# Patient Record
Sex: FEMALE | Race: ASIAN | NOT HISPANIC OR LATINO | Employment: FULL TIME | URBAN - METROPOLITAN AREA
[De-identification: names, ages, dates, MRNs, and addresses within clinical notes are randomized per-mention and may not be internally consistent; named-entity substitution may affect disease eponyms.]

---

## 2022-10-10 ENCOUNTER — HOSPITAL ENCOUNTER (OUTPATIENT)
Dept: GENERAL RADIOLOGY | Facility: HOSPITAL | Age: 42
Discharge: HOME | End: 2022-10-10
Admitting: FAMILY MEDICINE

## 2022-10-10 ENCOUNTER — OFFICE VISIT (OUTPATIENT)
Dept: FAMILY MEDICINE CLINIC | Facility: CLINIC | Age: 42
End: 2022-10-10

## 2022-10-10 VITALS
SYSTOLIC BLOOD PRESSURE: 143 MMHG | HEIGHT: 65 IN | OXYGEN SATURATION: 99 % | DIASTOLIC BLOOD PRESSURE: 97 MMHG | WEIGHT: 158.6 LBS | BODY MASS INDEX: 26.42 KG/M2 | HEART RATE: 75 BPM | TEMPERATURE: 96.6 F

## 2022-10-10 DIAGNOSIS — I10 ESSENTIAL HYPERTENSION: ICD-10-CM

## 2022-10-10 DIAGNOSIS — M25.522 LEFT ELBOW PAIN: Primary | ICD-10-CM

## 2022-10-10 DIAGNOSIS — M75.22 BICEPS TENDINITIS ON LEFT: ICD-10-CM

## 2022-10-10 PROCEDURE — 99203 OFFICE O/P NEW LOW 30 MIN: CPT | Performed by: FAMILY MEDICINE

## 2022-10-10 PROCEDURE — 73070 X-RAY EXAM OF ELBOW: CPT

## 2022-10-10 RX ORDER — AMLODIPINE BESYLATE 10 MG/1
1 TABLET ORAL DAILY
COMMUNITY
Start: 2022-04-29 | End: 2022-12-08 | Stop reason: SDUPTHER

## 2022-10-10 RX ORDER — FERROUS SULFATE TAB EC 324 MG (65 MG FE EQUIVALENT) 324 (65 FE) MG
324 TABLET DELAYED RESPONSE ORAL
COMMUNITY

## 2022-10-10 RX ORDER — ERGOCALCIFEROL 1.25 MG/1
50000 CAPSULE ORAL
COMMUNITY

## 2022-10-10 NOTE — PROGRESS NOTES
"Chief Complaint  Elbow Injury (Elbow pain/ hurts to extend left arm x 2 months )    Kaleigh Purvis presents to [facility] PRIMARY [facility]ARE  History of Present Illness     New patient.  Here to establish.  Left elbow pain.  2 months.  Hurts especially when she extends the elbow.  Also hurts to lift things and she feels little weak.  However no shooting pain or numbness or tingling into the arm of the wrist.  Normal to hand.  No known injury.  No known repetitive motion.  Worse with certain activities.  No swelling or pain of the wrist or knuckles.  She otherwise feels well.    Hypertension.  Longstanding.  She continues amlodipine 10 mg a day.  Her blood pressure is elevated today.  She has not checked it at home in some time.  I reviewed lab work from April of this year at [facility].  Normal creatinine sodium and potassium.    Objective   Vital Signs:  /97   Pulse 75   Temp 96.6 °F (35.9 °C) (Temporal)   Ht 165.1 cm (65\")   Wt 71.9 kg (158 lb 9.6 oz)   SpO2 99%   BMI 26.39 kg/m²   Estimated body mass index is 26.39 kg/m² as calculated from the following:    Height as of this encounter: 165.1 cm (65\").    Weight as of this encounter: 71.9 kg (158 lb 9.6 oz).          Physical Exam  Constitutional:       General: She is not in acute distress.     Appearance: She is not ill-appearing.   Cardiovascular:      Rate and Rhythm: Normal rate and regular rhythm.   Musculoskeletal:      Comments: Full range of motion of the shoulders and elbows.  She has pain to palpation at the insertion of the left biceps.  Worse with resisted pronation of the forearm.  No pain along the medial or lateral epicondyles.  The wrist and hand joints appear unremarkable.  Good pulses.        Result Review :                Assessment and Plan   Diagnoses and all orders for this visit:    1. Left elbow pain (Primary)  -     XR Elbow 2 View Left (In Office)  -     Ambulatory Referral to Physical Therapy " Evaluate and treat    2. Biceps tendinitis on left  -     Ambulatory Referral to Physical Therapy Evaluate and treat    3. Essential hypertension      Left elbow pain, 2 months probable biceps tendinopathy.  Worse with resisted pronation.  Referral to physical therapy.  Checking x-ray to rule out bone spur or other abnormality.    Hypertension.  Not controlled at this time.  She is going to be checking her blood pressure on a regular basis and see me back in 6 weeks.  The patient discharge information.         Follow Up   No follow-ups on file.  Patient was given instructions and counseling regarding her condition or for health maintenance advice. Please see specific information pulled into the AVS if appropriate.

## 2022-10-10 NOTE — PATIENT INSTRUCTIONS
The left elbow is likely caused by a tendinopathy or tendinitis of the biceps muscle, that involved and pronating the forearm.  It should get better on its own but I want to get an x-ray to rule out a bone spur or other problem, and also recommending a physical therapist with [facility].  I placed a referral and somebody should contact you    With regards to your blood pressure and concern that is high today.  I would like you to check your blood pressure on a regular basis over the next 2 or 3 weeks, sit 5 minutes first.  Make sure your blood pressure machine is not too old.  And then send me blood pressure readings in about 3 or 4 weeks and I want to see you in 6 weeks for a blood pressure recheck and general recheck.

## 2022-10-13 ENCOUNTER — PATIENT ROUNDING (BHMG ONLY) (OUTPATIENT)
Dept: FAMILY MEDICINE CLINIC | Facility: CLINIC | Age: 42
End: 2022-10-13

## 2022-10-13 NOTE — PROGRESS NOTES
A My-Chart message has been sent to the patient for PATIENT ROUNDING with [facility]

## 2022-11-29 ENCOUNTER — TELEMEDICINE (OUTPATIENT)
Dept: FAMILY MEDICINE CLINIC | Facility: CLINIC | Age: 42
End: 2022-11-29

## 2022-11-29 ENCOUNTER — TREATMENT (OUTPATIENT)
Dept: PHYSICAL THERAPY | Facility: CLINIC | Age: 42
End: 2022-11-29

## 2022-11-29 ENCOUNTER — TELEPHONE (OUTPATIENT)
Dept: FAMILY MEDICINE CLINIC | Facility: CLINIC | Age: 42
End: 2022-11-29

## 2022-11-29 DIAGNOSIS — R20.0 NUMBNESS AND TINGLING OF LEFT UPPER EXTREMITY: ICD-10-CM

## 2022-11-29 DIAGNOSIS — I10 ESSENTIAL HYPERTENSION: Primary | ICD-10-CM

## 2022-11-29 DIAGNOSIS — M25.522 LEFT ELBOW PAIN: Primary | ICD-10-CM

## 2022-11-29 DIAGNOSIS — R20.2 NUMBNESS AND TINGLING OF LEFT UPPER EXTREMITY: ICD-10-CM

## 2022-11-29 DIAGNOSIS — S46.212D BICEPS STRAIN, LEFT, SUBSEQUENT ENCOUNTER: ICD-10-CM

## 2022-11-29 PROCEDURE — 97162 PT EVAL MOD COMPLEX 30 MIN: CPT | Performed by: PHYSICAL THERAPIST

## 2022-11-29 PROCEDURE — 99213 OFFICE O/P EST LOW 20 MIN: CPT | Performed by: FAMILY MEDICINE

## 2022-11-29 PROCEDURE — 97110 THERAPEUTIC EXERCISES: CPT | Performed by: PHYSICAL THERAPIST

## 2022-11-29 PROCEDURE — 97140 MANUAL THERAPY 1/> REGIONS: CPT | Performed by: PHYSICAL THERAPIST

## 2022-11-29 RX ORDER — SPIRONOLACTONE 25 MG/1
25 TABLET ORAL DAILY
Qty: 90 TABLET | Refills: 1 | Status: SHIPPED | OUTPATIENT
Start: 2022-11-29

## 2022-11-29 NOTE — PROGRESS NOTES
Physical Therapy Initial Evaluation and Plan of Care      Patient: Mayte Purvis   : 1980  Diagnosis/ICD-10 Code:  Left elbow pain [M25.522]  Referring practitioner: Los Askew MD    Subjective Evaluation    History of Present Illness  Mechanism of injury: Insidious onset September. Initially had pain even  lifting cell phone. Better now but cannot do quick extensions.  Lifting with both arms Ok  Heavy grocery bag or heavy pot increases pain at elbow  Intermittent  N/T in LUE but has improved  Vertigo after Covid   Does not exercise, no recreational activities reported  ALso notes that L knee sometimes kemal when I pivot. I fell in house months ago and knee has bothered me since  ALso have some vertigo symptoms when rising from laying down.          Patient Occupation:  case managment full time Quality of life: good    Pain  Current pain ratin  Location: cubital fossa  Quality: discomfort, pulling and radiating  Aggravating factors: lifting and movement  Progression: improved    Social Support  Lives in: multiple-level home  Lives with: spouse and young children    Hand dominance: right    Diagnostic Tests  X-ray: normal    Treatments  No previous or current treatments  Patient Goals  Patient goals for therapy: increased strength and independence with ADLs/IADLs             Objective          Postural Observations  Seated posture: fair        Palpation   Left   No palpable tenderness to the biceps.   Tenderness of the biceps, scalenes and upper trapezius.     Additional Palpation Details  Elevated left first rib    Passive Range of Motion   Left Knee   Normal passive range of motion  Extension: Left knee passive extension: hyperextends.     Right Knee   Normal passive range of motion  Extension: Right knee passive extension: hyperextends.     Joint Play     Left Elbow   Hypermobile in the humeroulnar joint.     Right Elbow   Hypermobile in the humeroulnar joint.     Strength/Myotome  Testing   Cervical Spine     Right   Normal strength    Left Shoulder     Planes of Motion   Flexion: 4+   Extension: 5   Abduction: 4   External rotation at 0°: 4+   Internal rotation at 0°: 4+     Left Elbow   Flexion: 4 (sore)  Extension: 4+    Left Wrist/Hand   Wrist extension: 5 (sore)  Wrist flexion: 5    Tests   Cervical     Left   Positive brachial plexus traction.     Left Knee   Negative lateral Lm, medial Lm, valgus stress test at 0 degrees, valgus stress test at 30 degrees, varus stress test at 0 degrees and varus stress test at 30 degrees.     Left Knee Flexibility Comments:   Hyperextends > right knee    Functional Assessment     Comments  Quick DASH 25%          Assessment & Plan     Assessment  Impairments: abnormal muscle tone, activity intolerance, impaired physical strength, lacks appropriate home exercise program and pain with function  Functional Limitations: carrying objects, lifting and unable to perform repetitive tasks  Assessment details: Pt is a good candidate for skilled PT intervention to restore functional AROM and strength to return to previous level of ADL's.  Pt had reproduction of LUE symptoms with scalene/trap stretch with RSB Cspine. SHe carries her purse on left shoulder  Advised pt she can schedule with Nataly Ding if she wants eval for BPPV. I also instructed her in quad /hamstring strengthening for L knee.  Prognosis: good    Goals  Plan Goals: Short Term Goals ( 2 weeks)  1. Pt to perform quick extension movements LUE with minimal pain  2. Pt to exhibit 4+/5 bicep strength with less pain to allow for lifting with less pain  3. Pt to be (I) with HEP     Long Term Goals ( 6 weeks)  1. Pt to report no pain with lifting with ADL's  2. Pt to demonstrate 5/5 strength in biceps and shoulder  3. Pt able to lift and carry groceries and pots with no pain  4. Pt to score < 10% on DASH    Plan  Therapy options: will be seen for skilled therapy services  Planned therapy  interventions: body mechanics training, flexibility, functional ROM exercises, home exercise program, joint mobilization, manual therapy, postural training, soft tissue mobilization, spinal/joint mobilization, strengthening, stretching and therapeutic activities  Frequency: 1x week  Duration in weeks: 6  Treatment plan discussed with: patient        Manual Therapy:    10     mins  01176;  Therapeutic Exercise:    15     mins  80348;     Neuromuscular Chepe:        mins  73997;    Therapeutic Activity:          mins  22870;     Gait Training:           mins  76122;     Ultrasound:          mins  60498;    Electrical Stimulation:         mins  85950 ( );  Dry Needling          mins self-pay    Timed Treatment:   25   mins   Total Treatment:     60   mins    PT SIGNATURE: Celia Arredondo PT   [place] License # [id number]  DATE TREATMENT INITIATED: 11/29/2022    Initial Certification  Certification Period: 2/27/2023  I certify that the therapy services are furnished while this patient is under my care.  The services outlined above are required by this patient, and will be reviewed every 90 days.     PHYSICIAN: Los Askew MD      DATE:     Please sign and return via fax to [phone].. Thank you, [facility] Physical Therapy.

## 2022-11-29 NOTE — TELEPHONE ENCOUNTER
Called pt no answer lvm to sched a 2 week lab appt and a 3 month follow up on htn. Labs are ordered ok for hub to read and sched

## 2022-11-29 NOTE — PROGRESS NOTES
"Chief Complaint  F/U elevated blood pressure.     Subjective        Mayte Purvis presents to [facility]  History of Present Illness     Coronavirus pandemic telehealth visit.  Good audio and video connection.  Patient gave informed consent through the Rixty check in process.    Follow-up hypertension.  She was diagnosed with essential hypertension about 5 years ago.  Initially she was on lisinopril but she states that she did not respond to it.  No adverse effects described.  She was then placed on amlodipine and for significant period of time she states her blood pressure was running normal.  However she had COVID in recent months that she believes since then her blood pressures been higher.  She has a history of hypertension, with her parents with high blood pressure in their 50s.  She otherwise feels well.  She states occasionally if she feels for a brief period of time the little bit dizzy.  Otherwise no no symptoms.  I reviewed her blood pressure readings from home.  Her diastolic is in the 90s consistently and the systolic is consistently in the 130s and 140s..  Her creatinine in April was normal.  Her potassium was on the low side at 3.7.    Objective   Vital Signs:  There were no vitals taken for this visit.  Estimated body mass index is 26.39 kg/m² as calculated from the following:    Height as of 10/10/22: 165.1 cm (65\").    Weight as of 10/10/22: 71.9 kg (158 lb 9.6 oz).      1    Physical Exam  Constitutional:       Comments: The patient appears in no acute distress.  No respiratory distress.  Mood normal.        Result Review :  The following data was reviewed by: Los Askew MD on 11/29/2022:  Common labs    Common Labs 4/8/22 4/8/22    1508 1508   Glucose 102 (A)    BUN 8    Creatinine 0.56    Sodium 139    Potassium 3.7    Chloride 100    Calcium 9.3    Albumin 4.2    Total Bilirubin <0.2 (A)    Alkaline Phosphatase 78    AST (SGOT) 16    ALT (SGPT) 12    WBC  " 8.65   Hemoglobin  13.3   Hematocrit  40.0   Platelets  424   (A) Abnormal value                      Assessment and Plan   Diagnoses and all orders for this visit:    1. Essential hypertension (Primary)  -     Comprehensive Metabolic Panel; Future  -     Aldosterone / Renin Ratio; Future    Other orders  -     spironolactone (Aldactone) 25 MG tablet; Take 1 tablet by mouth Daily.  Dispense: 90 tablet; Refill: 1      Probable essential hypertension.  Not controlled.  Continue amp zotepine 10 mg a day.  I am checking an aldosterone renin ratio and a CMP.  She did not respond reportedly to an ACE inhibitor number of years ago.  Her potassium was on the low side.  I am avoiding an ARB/diuretic combo at this time.  However may need to prescribe in the future.  For the time being I am prescribing spironolactone 25 mg daily.  She is going to get electrolytes and blood work as above in about 2 weeks.  I will see her back in 3 months, but she will be sending the blood pressure readings within 1 month.  She will contact us with questions.       I spent 18 minutes caring for Mayte on this date of service. This time includes time spent by me in the following activities:preparing for the visit, reviewing tests, performing a medically appropriate examination and/or evaluation , counseling and educating the patient/family/caregiver, ordering medications, tests, or procedures and documenting information in the medical record  Follow Up   No follow-ups on file.  Patient was given instructions and counseling regarding her condition or for health maintenance advice. Please see specific information pulled into the AVS if appropriate.

## 2022-11-29 NOTE — TELEPHONE ENCOUNTER
Hub staff attempted to follow warm transfer process and was unsuccessful     Caller: Mayte Purvis    Relationship to patient: Self    Best call back number:[phone]    Patient is needing: PATIENT IS RETURNING NATHEN'S CALL. PATIENT IS NEEDING TO SCHEDULE LABS     HUB CANNOT SCHEDULE LAB APPOINTMENTS

## 2022-12-08 NOTE — TELEPHONE ENCOUNTER
Rx Refill Note  Requested Prescriptions     Pending Prescriptions Disp Refills   • amLODIPine (NORVASC) 10 MG tablet 90 tablet 1     Sig: Take 1 tablet by mouth Daily.      Last office visit with prescribing clinician: 10/10/2022   Last telemedicine visit with prescribing clinician: 2/27/2023   Next office visit with prescribing clinician: 2/27/2023                         Would you like a call back once the refill request has been completed: [] Yes [] No    If the office needs to give you a call back, can they leave a voicemail: [] Yes [] No    Luisa Hays  12/08/22, 13:41 EST

## 2022-12-09 RX ORDER — AMLODIPINE BESYLATE 10 MG/1
10 TABLET ORAL DAILY
Qty: 90 TABLET | Refills: 1 | Status: SHIPPED | OUTPATIENT
Start: 2022-12-09

## 2023-02-17 DIAGNOSIS — I10 ESSENTIAL HYPERTENSION: ICD-10-CM

## 2023-02-26 LAB
ALBUMIN SERPL-MCNC: 4.3 G/DL (ref 3.5–5.2)
ALBUMIN/GLOB SERPL: 1.8 G/DL
ALDOST SERPL-MCNC: 36.1 NG/DL (ref 0–30)
ALDOST/RENIN PLAS-RTO: 139.4 {RATIO} (ref 0–30)
ALP SERPL-CCNC: 72 U/L (ref 39–117)
ALT SERPL-CCNC: 9 U/L (ref 1–33)
AST SERPL-CCNC: 13 U/L (ref 1–32)
BILIRUB SERPL-MCNC: 0.4 MG/DL (ref 0–1.2)
BUN SERPL-MCNC: 10 MG/DL (ref 6–20)
BUN/CREAT SERPL: 12.5 (ref 7–25)
CALCIUM SERPL-MCNC: 9.3 MG/DL (ref 8.6–10.5)
CHLORIDE SERPL-SCNC: 102 MMOL/L (ref 98–107)
CO2 SERPL-SCNC: 26.6 MMOL/L (ref 22–29)
CREAT SERPL-MCNC: 0.8 MG/DL (ref 0.57–1)
EGFRCR SERPLBLD CKD-EPI 2021: 92.7 ML/MIN/1.73
GLOBULIN SER CALC-MCNC: 2.4 GM/DL
GLUCOSE SERPL-MCNC: 85 MG/DL (ref 65–99)
POTASSIUM SERPL-SCNC: 4.4 MMOL/L (ref 3.5–5.2)
PROT SERPL-MCNC: 6.7 G/DL (ref 6–8.5)
RENIN PLAS-CCNC: 0.26 NG/ML/HR (ref 0.17–5.38)
SODIUM SERPL-SCNC: 140 MMOL/L (ref 136–145)

## 2023-03-02 ENCOUNTER — OFFICE VISIT (OUTPATIENT)
Dept: FAMILY MEDICINE CLINIC | Facility: CLINIC | Age: 43
End: 2023-03-02
Payer: COMMERCIAL

## 2023-03-02 VITALS
TEMPERATURE: 97.1 F | OXYGEN SATURATION: 99 % | HEART RATE: 76 BPM | WEIGHT: 162.7 LBS | BODY MASS INDEX: 27.11 KG/M2 | DIASTOLIC BLOOD PRESSURE: 76 MMHG | HEIGHT: 65 IN | SYSTOLIC BLOOD PRESSURE: 126 MMHG

## 2023-03-02 DIAGNOSIS — I10 ESSENTIAL HYPERTENSION: ICD-10-CM

## 2023-03-02 DIAGNOSIS — I10 ESSENTIAL HYPERTENSION: Primary | ICD-10-CM

## 2023-03-02 PROCEDURE — 99214 OFFICE O/P EST MOD 30 MIN: CPT | Performed by: FAMILY MEDICINE

## 2023-03-02 NOTE — PATIENT INSTRUCTIONS
With regards to your high blood pressure, I am concerned about possible hyperaldosteronism.  There are some clues including the low potassium and most certainly the elevated aldosterone level recently.  I am going to repeat it.  He sometimes will be transiently high for no good reason.  Holding off any type of adrenal imaging at this time.  I would like you to see your endocrinologist for follow-up.  Certainly continue the spironolactone and the amlodipine.    In the meantime I want you to check your blood pressure daily for the next 3 or 4 weeks.  I would like you to sit for about 5 minutes first both feet on the ground and then check it with the arm monitor.  Write the numbers down.  Then in about 2 or 3 weeks send me numbers via the [employer] portal as a attachment such as a PDF.  Note I will be out of town the week of March 20.    At this time no alarm.  But I would do want a better idea on the diagnosis.  I think the endocrinologist will be a great choice.  Otherwise see me back in about 6 months.

## 2023-03-02 NOTE — PROGRESS NOTES
"Chief Complaint  HTN f/u     Subjective        Mayte Purvis presents to [facility]  History of Present Illness     Follow-up hypertension.  She had some borderline low potassium readings.  She was having inadequate control of her blood pressure on amlodipine 10 mg daily.  I added spironolactone 25 mg daily.  Ordered a aldosterone renin ratio but it was not done until last week.  She has not been on the spironolactone for 3 months.  She feels no different other than increased urination.  No cardiovascular symptoms.  No neurovascular symptoms.  She has not been checking her blood pressure up until yesterday when it was high which she states she was stressed.  Her blood pressure is much better today.  Her creatinine is normal.  Potassium normal.  Aldosterone level 36.  Aldosterone renin ratio very high.  She has CT scan done of the abdomen in 2017 which revealed normal adrenal glands.  She has no other concerns about her health other than some ongoing fatigue.  Some previous elbow pain is resolved.  She is followed by endocrinologist also.    Objective   Vital Signs:  /76   Pulse 76   Temp 97.1 °F (36.2 °C) (Temporal)   Ht 165.1 cm (65\")   Wt 73.8 kg (162 lb 11.2 oz)   SpO2 99%   BMI 27.07 kg/m²   Estimated body mass index is 27.07 kg/m² as calculated from the following:    Height as of this encounter: 165.1 cm (65\").    Weight as of this encounter: 73.8 kg (162 lb 11.2 oz).             Physical Exam  Vitals and nursing note reviewed.   Constitutional:       General: She is not in acute distress.     Appearance: She is well-developed.   Cardiovascular:      Rate and Rhythm: Normal rate and regular rhythm.      Heart sounds: Normal heart sounds.   Pulmonary:      Effort: Pulmonary effort is normal.      Breath sounds: Normal breath sounds.   Musculoskeletal:      Cervical back: Normal range of motion.   Skin:     General: Skin is warm and dry.   Psychiatric:         Mood and " Affect: Mood normal.        Result Review :                   Assessment and Plan   Diagnoses and all orders for this visit:    1. Essential hypertension (Primary)  -     Aldosterone / Renin Ratio; Future      Hypertension.  Essential hypertension versus hyperaldosteronism.  Rechecking aldosterone level today.  I want her to start checking her blood pressure on a regular basis.  I would refer her back to her endocrinologist, she is going to call for an appointment.  I will see her in 6 months otherwise for complete physical examination.  In the meantime continue amlodipine, continue spironolactone, and check blood pressure readings at home.  See patient discharge instructions.       Follow Up   No follow-ups on file.  Patient was given instructions and counseling regarding her condition or for health maintenance advice. Please see specific information pulled into the AVS if appropriate.

## 2023-03-07 RX ORDER — ERGOCALCIFEROL 1.25 MG/1
50000 CAPSULE ORAL WEEKLY
Status: CANCELLED | OUTPATIENT
Start: 2023-03-07

## 2023-03-07 NOTE — TELEPHONE ENCOUNTER
Rx Refill Note  Requested Prescriptions     Pending Prescriptions Disp Refills   • ergocalciferol (ERGOCALCIFEROL) 1.25 MG (80674 UT) capsule       Sig: Take 1 capsule by mouth 1 (One) Time Per Week.      Last office visit with prescribing clinician: 3/2/2023   Last telemedicine visit with prescribing clinician: Visit date not found   Next office visit with prescribing clinician: 9/7/2023                         Would you like a call back once the refill request has been completed: [] Yes [] No    If the office needs to give you a call back, can they leave a voicemail: [] Yes [] No    Luisa Hays  03/07/23, 15:04 EST

## 2023-03-08 NOTE — TELEPHONE ENCOUNTER
disregard med request per pt mychart message   This encounter was created in error - please disregard.

## 2023-03-16 LAB
ALDOST SERPL-MCNC: 15.4 NG/DL (ref 0–30)
ALDOST/RENIN PLAS-RTO: 64.7 {RATIO} (ref 0–30)
RENIN PLAS-CCNC: 0.24 NG/ML/HR (ref 0.17–5.38)

## 2023-04-18 ENCOUNTER — TRANSCRIBE ORDERS (OUTPATIENT)
Dept: ADMINISTRATIVE | Facility: HOSPITAL | Age: 43
End: 2023-04-18
Payer: COMMERCIAL

## 2023-04-18 DIAGNOSIS — I10 ESSENTIAL HYPERTENSION, MALIGNANT: ICD-10-CM

## 2023-04-18 DIAGNOSIS — R79.89 HYPOURICEMIA: Primary | ICD-10-CM

## 2023-04-19 PROBLEM — R79.89 HIGH ALDOSTERONE LEVEL: Status: ACTIVE | Noted: 2023-04-19

## 2023-04-19 RX ORDER — SODIUM CHLORIDE 9 MG/ML
2000 INJECTION, SOLUTION INTRAVENOUS ONCE
Start: 2023-04-21 | End: 2023-04-21

## 2023-04-21 ENCOUNTER — HOSPITAL ENCOUNTER (OUTPATIENT)
Dept: INFUSION THERAPY | Facility: HOSPITAL | Age: 43
Discharge: HOME | End: 2023-04-21
Payer: COMMERCIAL

## 2023-04-21 VITALS
DIASTOLIC BLOOD PRESSURE: 90 MMHG | TEMPERATURE: 97.3 F | SYSTOLIC BLOOD PRESSURE: 130 MMHG | BODY MASS INDEX: 27.16 KG/M2 | WEIGHT: 163.2 LBS | RESPIRATION RATE: 20 BRPM | HEART RATE: 70 BPM | OXYGEN SATURATION: 100 %

## 2023-04-21 DIAGNOSIS — R79.89 HYPOURICEMIA: ICD-10-CM

## 2023-04-21 DIAGNOSIS — I10 ESSENTIAL HYPERTENSION, MALIGNANT: ICD-10-CM

## 2023-04-21 LAB
ANION GAP SERPL CALCULATED.3IONS-SCNC: 6.3 MMOL/L (ref 5–15)
ANION GAP SERPL CALCULATED.3IONS-SCNC: 7.2 MMOL/L (ref 5–15)
BUN SERPL-MCNC: 7 MG/DL (ref 6–20)
BUN SERPL-MCNC: 9 MG/DL (ref 6–20)
BUN/CREAT SERPL: 12.9 (ref 7–25)
BUN/CREAT SERPL: 13.5 (ref 7–25)
CALCIUM SPEC-SCNC: 8 MG/DL (ref 8.6–10.5)
CALCIUM SPEC-SCNC: 8.7 MG/DL (ref 8.6–10.5)
CHLORIDE SERPL-SCNC: 106 MMOL/L (ref 98–107)
CHLORIDE SERPL-SCNC: 111 MMOL/L (ref 98–107)
CO2 SERPL-SCNC: 22.8 MMOL/L (ref 22–29)
CO2 SERPL-SCNC: 26.7 MMOL/L (ref 22–29)
CREAT SERPL-MCNC: 0.52 MG/DL (ref 0.57–1)
CREAT SERPL-MCNC: 0.7 MG/DL (ref 0.57–1)
EGFRCR SERPLBLD CKD-EPI 2021: 108.8 ML/MIN/1.73
EGFRCR SERPLBLD CKD-EPI 2021: 116.9 ML/MIN/1.73
GLUCOSE SERPL-MCNC: 86 MG/DL (ref 65–99)
GLUCOSE SERPL-MCNC: 97 MG/DL (ref 65–99)
POTASSIUM SERPL-SCNC: 3.7 MMOL/L (ref 3.5–5.2)
POTASSIUM SERPL-SCNC: 3.9 MMOL/L (ref 3.5–5.2)
POTASSIUM SERPL-SCNC: 3.9 MMOL/L (ref 3.5–5.2)
SODIUM SERPL-SCNC: 139 MMOL/L (ref 136–145)
SODIUM SERPL-SCNC: 141 MMOL/L (ref 136–145)

## 2023-04-21 PROCEDURE — 96360 HYDRATION IV INFUSION INIT: CPT

## 2023-04-21 PROCEDURE — 96361 HYDRATE IV INFUSION ADD-ON: CPT

## 2023-04-21 PROCEDURE — 84244 ASSAY OF RENIN: CPT | Performed by: INTERNAL MEDICINE

## 2023-04-21 PROCEDURE — 36415 COLL VENOUS BLD VENIPUNCTURE: CPT

## 2023-04-21 PROCEDURE — 82088 ASSAY OF ALDOSTERONE: CPT | Performed by: INTERNAL MEDICINE

## 2023-04-21 PROCEDURE — 96365 THER/PROPH/DIAG IV INF INIT: CPT

## 2023-04-21 PROCEDURE — 96366 THER/PROPH/DIAG IV INF ADDON: CPT

## 2023-04-21 PROCEDURE — 80048 BASIC METABOLIC PNL TOTAL CA: CPT | Performed by: INTERNAL MEDICINE

## 2023-04-21 PROCEDURE — 84132 ASSAY OF SERUM POTASSIUM: CPT | Performed by: INTERNAL MEDICINE

## 2023-04-21 RX ORDER — POTASSIUM CHLORIDE 750 MG/1
40 TABLET, FILM COATED, EXTENDED RELEASE ORAL ONCE
Status: COMPLETED | OUTPATIENT
Start: 2023-04-21 | End: 2023-04-21

## 2023-04-21 RX ORDER — AMILORIDE HYDROCHLORIDE 5 MG/1
5 TABLET ORAL DAILY
COMMUNITY

## 2023-04-21 RX ORDER — SODIUM CHLORIDE 9 MG/ML
500 INJECTION, SOLUTION INTRAVENOUS CONTINUOUS
Status: DISCONTINUED | OUTPATIENT
Start: 2023-04-21 | End: 2023-04-23 | Stop reason: HOSPADM

## 2023-04-21 RX ORDER — POTASSIUM CHLORIDE 750 MG/1
40 CAPSULE, EXTENDED RELEASE ORAL ONCE
Status: DISCONTINUED | OUTPATIENT
Start: 2023-04-21 | End: 2023-04-21

## 2023-04-21 RX ADMIN — SODIUM CHLORIDE 500 ML/HR: 9 INJECTION, SOLUTION INTRAVENOUS at 11:38

## 2023-04-21 RX ADMIN — POTASSIUM CHLORIDE 40 MEQ: 750 TABLET, EXTENDED RELEASE ORAL at 10:54

## 2023-04-25 LAB — ALDOST SERPL-MCNC: 12 NG/DL (ref 0–30)

## 2023-04-28 LAB
ALDOST SERPL-MCNC: 21.6 NG/DL (ref 0–30)
RENIN PLAS-CCNC: 0.18 NG/ML/HR (ref 0.17–5.38)
RENIN PLAS-CCNC: <0.167 NG/ML/HR (ref 0.17–5.38)

## 2023-05-02 ENCOUNTER — TRANSCRIBE ORDERS (OUTPATIENT)
Dept: ADMINISTRATIVE | Facility: HOSPITAL | Age: 43
End: 2023-05-02
Payer: COMMERCIAL

## 2023-05-02 DIAGNOSIS — I10 ESSENTIAL HYPERTENSION, BENIGN: ICD-10-CM

## 2023-05-02 DIAGNOSIS — E26.09 PRIMARY HYPERALDOSTERONISM: Primary | ICD-10-CM

## 2023-05-17 ENCOUNTER — HOSPITAL ENCOUNTER (OUTPATIENT)
Dept: CT IMAGING | Facility: HOSPITAL | Age: 43
Discharge: HOME | End: 2023-05-17
Admitting: INTERNAL MEDICINE
Payer: COMMERCIAL

## 2023-05-17 DIAGNOSIS — I10 ESSENTIAL HYPERTENSION, BENIGN: ICD-10-CM

## 2023-05-17 DIAGNOSIS — E26.09 PRIMARY HYPERALDOSTERONISM: ICD-10-CM

## 2023-05-17 PROCEDURE — 25510000001 IOPAMIDOL 61 % SOLUTION: Performed by: INTERNAL MEDICINE

## 2023-05-17 PROCEDURE — 0 DIATRIZOATE MEGLUMINE & SODIUM PER 1 ML: Performed by: INTERNAL MEDICINE

## 2023-05-17 PROCEDURE — 74170 CT ABD WO CNTRST FLWD CNTRST: CPT

## 2023-05-17 RX ADMIN — DIATRIZOATE MEGLUMINE AND DIATRIZOATE SODIUM 30 ML: 660; 100 LIQUID ORAL; RECTAL at 17:55

## 2023-05-17 RX ADMIN — IOPAMIDOL 100 ML: 612 INJECTION, SOLUTION INTRAVENOUS at 18:40

## 2023-10-16 ENCOUNTER — OFFICE VISIT (OUTPATIENT)
Dept: FAMILY MEDICINE CLINIC | Facility: CLINIC | Age: 43
End: 2023-10-16
Payer: COMMERCIAL

## 2023-10-16 VITALS
HEART RATE: 69 BPM | BODY MASS INDEX: 27.47 KG/M2 | SYSTOLIC BLOOD PRESSURE: 96 MMHG | TEMPERATURE: 97.8 F | OXYGEN SATURATION: 99 % | DIASTOLIC BLOOD PRESSURE: 73 MMHG | HEIGHT: 65 IN | WEIGHT: 164.9 LBS

## 2023-10-16 DIAGNOSIS — I10 ESSENTIAL HYPERTENSION: ICD-10-CM

## 2023-10-16 DIAGNOSIS — Z00.00 HEALTH CARE MAINTENANCE: Primary | ICD-10-CM

## 2023-10-16 DIAGNOSIS — K76.9 HEPATIC LESION: ICD-10-CM

## 2023-10-16 RX ORDER — AMLODIPINE BESYLATE 5 MG/1
5 TABLET ORAL DAILY
Qty: 90 TABLET | Refills: 1 | Status: SHIPPED | OUTPATIENT
Start: 2023-10-16

## 2023-10-16 NOTE — PROGRESS NOTES
"Chief Complaint  Annual Exam    Subjective        Mayte Purvis presents to [facility] PR[facility]ARE  History of Present Illness    Here for annual healthcare maintenance visit.  Non-smoker.  No alcohol use.  She is not been exercising much lately because she recently had surgery.  I was working her up earlier this year for hyperaldosteronism.  Her aldosterone level was relatively high and her renin was suppressed.  We added spironolactone.  She continue on amlodipine 10 mg a day.  She went back to her endocrinologist.  Endocrinologist ordered a CT scan of the abdomen and pelvis which revealed a pelvic mass.  Unremarkable adrenal glands.  And a indeterminate 2 cm left medical lobe lesion.  Follow-up with MRI was recommended.  However the patient was directed instead to the gynecologist, very appropriately.  And then to a oncologic surgeon.  She had a large ovarian cyst removed.  It was described as benign.  She states she is doing well since surgery.  Some soreness.  She will be going back to work soon.  Her blood pressures been running on the low side at home.  Today it is low.  No hypotensive symptomatology.  Her sodium potassium and glucose level was normal about a month ago.  Her last lipid panel was about 2 years ago.    Objective   Vital Signs:  BP 96/73   Pulse 69   Temp 97.8 °F (36.6 °C) (Temporal)   Ht 165.1 cm (65\")   Wt 74.8 kg (164 lb 14.4 oz)   SpO2 99%   BMI 27.44 kg/m²   Estimated body mass index is 27.44 kg/m² as calculated from the following:    Height as of this encounter: 165.1 cm (65\").    Weight as of this encounter: 74.8 kg (164 lb 14.4 oz).               Physical Exam  Constitutional:       Appearance: Normal appearance.   HENT:      Head: Atraumatic.      Mouth/Throat:      Pharynx: Oropharynx is clear. No oropharyngeal exudate or posterior oropharyngeal erythema.   Eyes:      Conjunctiva/sclera: Conjunctivae normal.   Neck:      Comments: Thyroid examination " unremarkable  Cardiovascular:      Rate and Rhythm: Normal rate and regular rhythm.      Pulses: Normal pulses.      Heart sounds: Normal heart sounds.   Pulmonary:      Effort: Pulmonary effort is normal.      Breath sounds: Normal breath sounds.   Abdominal:      General: Abdomen is flat. There is no distension.      Palpations: Abdomen is soft.      Comments: Surgical wounds appear unremarkable.   Musculoskeletal:         General: Normal range of motion.      Cervical back: Normal range of motion and neck supple. No muscular tenderness.   Lymphadenopathy:      Cervical: No cervical adenopathy.   Skin:     General: Skin is warm and dry.      Findings: No rash.   Neurological:      General: No focal deficit present.      Mental Status: She is alert and oriented to person, place, and time.   Psychiatric:         Mood and Affect: Mood normal.        Result Review :  The following data was reviewed by: Los Askew MD on 10/16/2023:  Common labs          9/22/2023    09:11 9/23/2023    03:41 9/28/2023    14:07   Common Labs   Potassium 4.0         WBC  13.60     14.53       Hemoglobin  11.2     12.3       Hematocrit  34.1     37.9       Platelets  309     338          Details          This result is from an external source.                          Assessment and Plan   Diagnoses and all orders for this visit:    1. Health care maintenance (Primary)  -     CBC & Differential  -     Comprehensive Metabolic Panel  -     Lipid Panel    2. Essential hypertension  -     CBC & Differential  -     Comprehensive Metabolic Panel  -     Lipid Panel    3. Hepatic lesion  -     MRI abdomen w wo contrast; Future    Other orders  -     amLODIPine (NORVASC) 5 MG tablet; Take 1 tablet by mouth Daily.  Dispense: 90 tablet; Refill: 1      Annual healthcare maintenance visit.    Immunizations.  Flu shot recommended.    Hypertension.  Likely overcontrolled.  Decrease amlodipine from 10 mg a day down to 5 mg a day.  Continue  spironolactone.  Checking lab work today.  See me in 1 year.  Sooner as needed.  Check blood pressure on a regular basis over the next few weeks.  If running high let us know.  Target is 100 and teens over 70s.    MRI with a left hepatic lobe 2 cm indeterminate lesion.  The radiologist says it may be a atypical hemangioma.  The radiologist recommends a follow-up MRI of the liver.  I am ordering this.  At this point, low probability of malignancy.  The patient may also wish to talk with her oncologic surgeon with regards to this liver finding.    Other preventative health practices discussed.  See me in 1 year.  Sooner as needed.         Follow Up   No follow-ups on file.  Patient was given instructions and counseling regarding her condition or for health maintenance advice. Please see specific information pulled into the AVS if appropriate.

## 2023-10-17 LAB
ALBUMIN SERPL-MCNC: 4.4 G/DL (ref 3.5–5.2)
ALBUMIN/GLOB SERPL: 1.5 G/DL
ALP SERPL-CCNC: 85 U/L (ref 39–117)
ALT SERPL-CCNC: 10 U/L (ref 1–33)
AST SERPL-CCNC: 12 U/L (ref 1–32)
BASOPHILS # BLD AUTO: 0.1 10*3/MM3 (ref 0–0.2)
BASOPHILS NFR BLD AUTO: 1.1 % (ref 0–1.5)
BILIRUB SERPL-MCNC: 0.3 MG/DL (ref 0–1.2)
BUN SERPL-MCNC: 10 MG/DL (ref 6–20)
BUN/CREAT SERPL: 13.3 (ref 7–25)
CALCIUM SERPL-MCNC: 9.9 MG/DL (ref 8.6–10.5)
CHLORIDE SERPL-SCNC: 103 MMOL/L (ref 98–107)
CHOLEST SERPL-MCNC: 251 MG/DL (ref 0–200)
CO2 SERPL-SCNC: 28.2 MMOL/L (ref 22–29)
CREAT SERPL-MCNC: 0.75 MG/DL (ref 0.57–1)
EGFRCR SERPLBLD CKD-EPI 2021: 100.2 ML/MIN/1.73
EOSINOPHIL # BLD AUTO: 0.57 10*3/MM3 (ref 0–0.4)
EOSINOPHIL NFR BLD AUTO: 6.3 % (ref 0.3–6.2)
ERYTHROCYTE [DISTWIDTH] IN BLOOD BY AUTOMATED COUNT: 12.6 % (ref 12.3–15.4)
GLOBULIN SER CALC-MCNC: 3 GM/DL
GLUCOSE SERPL-MCNC: 88 MG/DL (ref 65–99)
HCT VFR BLD AUTO: 39.3 % (ref 34–46.6)
HDLC SERPL-MCNC: 43 MG/DL (ref 40–60)
HGB BLD-MCNC: 13 G/DL (ref 12–15.9)
IMM GRANULOCYTES # BLD AUTO: 0.03 10*3/MM3 (ref 0–0.05)
IMM GRANULOCYTES NFR BLD AUTO: 0.3 % (ref 0–0.5)
LDLC SERPL CALC-MCNC: 174 MG/DL (ref 0–100)
LYMPHOCYTES # BLD AUTO: 2.25 10*3/MM3 (ref 0.7–3.1)
LYMPHOCYTES NFR BLD AUTO: 24.8 % (ref 19.6–45.3)
MCH RBC QN AUTO: 26.9 PG (ref 26.6–33)
MCHC RBC AUTO-ENTMCNC: 33.1 G/DL (ref 31.5–35.7)
MCV RBC AUTO: 81.2 FL (ref 79–97)
MONOCYTES # BLD AUTO: 0.54 10*3/MM3 (ref 0.1–0.9)
MONOCYTES NFR BLD AUTO: 5.9 % (ref 5–12)
NEUTROPHILS # BLD AUTO: 5.59 10*3/MM3 (ref 1.7–7)
NEUTROPHILS NFR BLD AUTO: 61.6 % (ref 42.7–76)
NRBC BLD AUTO-RTO: 0 /100 WBC (ref 0–0.2)
PLATELET # BLD AUTO: 460 10*3/MM3 (ref 140–450)
POTASSIUM SERPL-SCNC: 5 MMOL/L (ref 3.5–5.2)
PROT SERPL-MCNC: 7.4 G/DL (ref 6–8.5)
RBC # BLD AUTO: 4.84 10*6/MM3 (ref 3.77–5.28)
SODIUM SERPL-SCNC: 138 MMOL/L (ref 136–145)
TRIGL SERPL-MCNC: 181 MG/DL (ref 0–150)
VLDLC SERPL CALC-MCNC: 34 MG/DL (ref 5–40)
WBC # BLD AUTO: 9.08 10*3/MM3 (ref 3.4–10.8)

## 2023-11-19 ENCOUNTER — HOSPITAL ENCOUNTER (OUTPATIENT)
Dept: MRI IMAGING | Facility: HOSPITAL | Age: 43
Discharge: HOME | End: 2023-11-19
Admitting: FAMILY MEDICINE
Payer: COMMERCIAL

## 2023-11-19 DIAGNOSIS — K76.9 HEPATIC LESION: ICD-10-CM

## 2023-11-19 PROCEDURE — 0 GADOBENATE DIMEGLUMINE 529 MG/ML SOLUTION: Performed by: FAMILY MEDICINE

## 2023-11-19 PROCEDURE — A9577 INJ MULTIHANCE: HCPCS | Performed by: FAMILY MEDICINE

## 2023-11-19 PROCEDURE — 74183 MRI ABD W/O CNTR FLWD CNTR: CPT

## 2023-11-19 RX ADMIN — GADOBENATE DIMEGLUMINE 15 ML: 529 INJECTION, SOLUTION INTRAVENOUS at 09:21

## 2023-12-05 ENCOUNTER — OFFICE VISIT (OUTPATIENT)
Dept: FAMILY MEDICINE CLINIC | Facility: CLINIC | Age: 43
End: 2023-12-05
Payer: COMMERCIAL

## 2023-12-05 VITALS
SYSTOLIC BLOOD PRESSURE: 125 MMHG | DIASTOLIC BLOOD PRESSURE: 85 MMHG | HEART RATE: 83 BPM | OXYGEN SATURATION: 100 % | HEIGHT: 65 IN | TEMPERATURE: 96.6 F | WEIGHT: 167.1 LBS | BODY MASS INDEX: 27.84 KG/M2

## 2023-12-05 DIAGNOSIS — L30.8 PRURITIC DERMATITIS: Primary | ICD-10-CM

## 2023-12-05 PROCEDURE — 99213 OFFICE O/P EST LOW 20 MIN: CPT | Performed by: FAMILY MEDICINE

## 2023-12-05 RX ORDER — PREDNISONE 20 MG/1
20 TABLET ORAL DAILY
Qty: 5 TABLET | Refills: 0 | Status: SHIPPED | OUTPATIENT
Start: 2023-12-05

## 2023-12-05 RX ORDER — HYDROXYZINE HYDROCHLORIDE 25 MG/1
25 TABLET, FILM COATED ORAL 3 TIMES DAILY PRN
Qty: 30 TABLET | Refills: 0 | Status: SHIPPED | OUTPATIENT
Start: 2023-12-05

## 2023-12-05 NOTE — PROGRESS NOTES
"Chief Complaint  Rash (All over skin itching for over a rash x 1 week )    Subjective        Mayte Purvis presents to [facility]  History of Present Illness    About 1 week of a rash.  Mostly pruritus.  Some scratchy areas with the rash but no generalized hives or breaking out.  For started around her keloid scars on her abdomen from the hysterectomy number weeks ago.  Then she got very anxious about it.  She thought she had bedbugs.  She has had no constitutional symptoms.  She has been bathing daily with Dove soap in the shower.  She states the shower makes itching worse.  No jaundice.  Her lab tests about a month ago are normal.    Objective   Vital Signs:  /85   Pulse 83   Temp 96.6 °F (35.9 °C) (Temporal)   Ht 165.1 cm (65\")   Wt 75.8 kg (167 lb 1.6 oz)   SpO2 100%   BMI 27.81 kg/m²   Estimated body mass index is 27.81 kg/m² as calculated from the following:    Height as of this encounter: 165.1 cm (65\").    Weight as of this encounter: 75.8 kg (167 lb 1.6 oz).             Physical Exam  Constitutional:       Appearance: She is not ill-appearing.      Comments: Looks tired but otherwise no acute distress.   Cardiovascular:      Rate and Rhythm: Normal rate.   Pulmonary:      Effort: Pulmonary effort is normal.   Abdominal:      Tenderness: There is no abdominal tenderness.   Skin:     Comments: She has generalized dry skin, especially on the back and the anterior shins.  Left anterior shin there is an areas of eczema with some longstanding postinflammatory hyperpigmentation.  There are some scratches on the back and on the wrist from itching with no definitive primary lesion.  No urticaria.  Skin no jaundice.  Conjunctiva no scleral icterus.        Result Review :  The following data was reviewed by: Los Askew MD on 12/05/2023:  Common labs          9/23/2023    03:41 9/28/2023    14:07 10/16/2023    15:50   Common Labs   Glucose   88    BUN   10  "   Creatinine   0.75    Sodium   138    Potassium   5.0    Chloride   103    Calcium   9.9    Total Protein   7.4    Albumin   4.4    Total Bilirubin   0.3    Alkaline Phosphatase   85    AST (SGOT)   12    ALT (SGPT)   10    WBC 13.60     14.53     9.08    Hemoglobin 11.2     12.3     13.0    Hematocrit 34.1     37.9     39.3    Platelets 309     338     460    Total Cholesterol   251    Triglycerides   181    HDL Cholesterol   43    LDL Cholesterol    174       Details          This result is from an external source.                          Assessment and Plan   Diagnoses and all orders for this visit:    1. Pruritic dermatitis (Primary)  -     CBC & Differential  -     Comprehensive Metabolic Panel  -     TSH Rfx On Abnormal To Free T4    Other orders  -     hydrOXYzine (ATARAX) 25 MG tablet; Take 1 tablet by mouth 3 (Three) Times a Day As Needed for Itching.  Dispense: 30 tablet; Refill: 0  -     predniSONE (DELTASONE) 20 MG tablet; Take 1 tablet by mouth Daily.  Dispense: 5 tablet; Refill: 0      Pruritus with possible secondary dermatitis from scratching.  Also some scattered eczema especially in lower extremities.  There is definite xerosis/dry skin.  I am recommending cutting down on bathing.  CeraVe a ointment not cream.  Prednisone 20 mg daily for 5 days.  Above lab work.  Hydroxyzine as needed, do not take while driving.  Reassurance at this time.  However if not better in a couple weeks may need further investigation.  She will contact me with questions.         Follow Up   No follow-ups on file.  Patient was given instructions and counseling regarding her condition or for health maintenance advice. Please see specific information pulled into the AVS if appropriate.

## 2023-12-06 DIAGNOSIS — L30.8 PRURITIC DERMATITIS: Primary | ICD-10-CM

## 2023-12-06 LAB
ALBUMIN SERPL-MCNC: 4.3 G/DL (ref 3.5–5.2)
ALBUMIN/GLOB SERPL: 1.6 G/DL
ALP SERPL-CCNC: 76 U/L (ref 39–117)
ALT SERPL-CCNC: 11 U/L (ref 1–33)
AST SERPL-CCNC: 13 U/L (ref 1–32)
BASOPHILS # BLD AUTO: 0.07 10*3/MM3 (ref 0–0.2)
BASOPHILS NFR BLD AUTO: 0.6 % (ref 0–1.5)
BILIRUB SERPL-MCNC: 0.5 MG/DL (ref 0–1.2)
BUN SERPL-MCNC: 13 MG/DL (ref 6–20)
BUN/CREAT SERPL: 18.3 (ref 7–25)
CALCIUM SERPL-MCNC: 9 MG/DL (ref 8.6–10.5)
CHLORIDE SERPL-SCNC: 105 MMOL/L (ref 98–107)
CO2 SERPL-SCNC: 25.5 MMOL/L (ref 22–29)
CREAT SERPL-MCNC: 0.71 MG/DL (ref 0.57–1)
EGFRCR SERPLBLD CKD-EPI 2021: 107 ML/MIN/1.73
EOSINOPHIL # BLD AUTO: 0.61 10*3/MM3 (ref 0–0.4)
EOSINOPHIL NFR BLD AUTO: 5.4 % (ref 0.3–6.2)
ERYTHROCYTE [DISTWIDTH] IN BLOOD BY AUTOMATED COUNT: 12.5 % (ref 12.3–15.4)
GLOBULIN SER CALC-MCNC: 2.7 GM/DL
GLUCOSE SERPL-MCNC: 84 MG/DL (ref 65–99)
HCT VFR BLD AUTO: 39.1 % (ref 34–46.6)
HGB BLD-MCNC: 12.4 G/DL (ref 12–15.9)
IMM GRANULOCYTES # BLD AUTO: 0.05 10*3/MM3 (ref 0–0.05)
IMM GRANULOCYTES NFR BLD AUTO: 0.4 % (ref 0–0.5)
LYMPHOCYTES # BLD AUTO: 2.29 10*3/MM3 (ref 0.7–3.1)
LYMPHOCYTES NFR BLD AUTO: 20.2 % (ref 19.6–45.3)
MCH RBC QN AUTO: 25.7 PG (ref 26.6–33)
MCHC RBC AUTO-ENTMCNC: 31.7 G/DL (ref 31.5–35.7)
MCV RBC AUTO: 81.1 FL (ref 79–97)
MONOCYTES # BLD AUTO: 0.47 10*3/MM3 (ref 0.1–0.9)
MONOCYTES NFR BLD AUTO: 4.2 % (ref 5–12)
NEUTROPHILS # BLD AUTO: 7.82 10*3/MM3 (ref 1.7–7)
NEUTROPHILS NFR BLD AUTO: 69.2 % (ref 42.7–76)
NRBC BLD AUTO-RTO: 0 /100 WBC (ref 0–0.2)
PLATELET # BLD AUTO: 407 10*3/MM3 (ref 140–450)
POTASSIUM SERPL-SCNC: 4.2 MMOL/L (ref 3.5–5.2)
PROT SERPL-MCNC: 7 G/DL (ref 6–8.5)
RBC # BLD AUTO: 4.82 10*6/MM3 (ref 3.77–5.28)
SODIUM SERPL-SCNC: 140 MMOL/L (ref 136–145)
TSH SERPL DL<=0.005 MIU/L-ACNC: 3.15 UIU/ML (ref 0.27–4.2)
WBC # BLD AUTO: 11.31 10*3/MM3 (ref 3.4–10.8)

## 2023-12-06 NOTE — PROGRESS NOTES
Phone call patient.  The itching is little bit better in the day but it keeps her up at night still.  The hydroxyzine 25 mg and not seem to help.  She can try 1-1/2 up to 2 of the hydroxyzine, total of 50 mg at nighttime, tonight if she wishes.  Continue the prednisone.  I am also recommending an allergy doctor referral.  Referral has been placed.  If not improving by the end the week we can consider adding some doxepin, or increasing her prednisone dose.  But at this point I am recommending she get to an allergy doctor soon as possible.  The complete blood count demonstrates elevated eosinophils which could be allergy related.  But there could be other systemic issues going on also.  Or may just be a normal variation for her.  Referral placed.

## 2023-12-15 ENCOUNTER — LAB (OUTPATIENT)
Dept: LAB | Facility: HOSPITAL | Age: 43
End: 2023-12-15
Payer: COMMERCIAL

## 2023-12-15 DIAGNOSIS — L30.8 PRURITIC DERMATITIS: Primary | ICD-10-CM

## 2023-12-15 DIAGNOSIS — L30.8 PRURITIC DERMATITIS: ICD-10-CM

## 2023-12-15 LAB
BASOPHILS # BLD AUTO: 0.1 10*3/MM3 (ref 0–0.2)
BASOPHILS NFR BLD AUTO: 0.8 % (ref 0–1.5)
DEPRECATED RDW RBC AUTO: 38.5 FL (ref 37–54)
EOSINOPHIL # BLD AUTO: 0.77 10*3/MM3 (ref 0–0.4)
EOSINOPHIL NFR BLD AUTO: 6.5 % (ref 0.3–6.2)
ERYTHROCYTE [DISTWIDTH] IN BLOOD BY AUTOMATED COUNT: 12.8 % (ref 12.3–15.4)
HCT VFR BLD AUTO: 40.4 % (ref 34–46.6)
HGB BLD-MCNC: 13 G/DL (ref 12–15.9)
IMM GRANULOCYTES # BLD AUTO: 0.07 10*3/MM3 (ref 0–0.05)
IMM GRANULOCYTES NFR BLD AUTO: 0.6 % (ref 0–0.5)
LYMPHOCYTES # BLD AUTO: 2.59 10*3/MM3 (ref 0.7–3.1)
LYMPHOCYTES NFR BLD AUTO: 21.9 % (ref 19.6–45.3)
MCH RBC QN AUTO: 26.6 PG (ref 26.6–33)
MCHC RBC AUTO-ENTMCNC: 32.2 G/DL (ref 31.5–35.7)
MCV RBC AUTO: 82.6 FL (ref 79–97)
MONOCYTES # BLD AUTO: 0.63 10*3/MM3 (ref 0.1–0.9)
MONOCYTES NFR BLD AUTO: 5.3 % (ref 5–12)
NEUTROPHILS NFR BLD AUTO: 64.9 % (ref 42.7–76)
NEUTROPHILS NFR BLD AUTO: 7.65 10*3/MM3 (ref 1.7–7)
NRBC BLD AUTO-RTO: 0 /100 WBC (ref 0–0.2)
PLATELET # BLD AUTO: 393 10*3/MM3 (ref 140–450)
PMV BLD AUTO: 8.8 FL (ref 6–12)
RBC # BLD AUTO: 4.89 10*6/MM3 (ref 3.77–5.28)
WBC NRBC COR # BLD AUTO: 11.81 10*3/MM3 (ref 3.4–10.8)

## 2023-12-15 PROCEDURE — 86038 ANTINUCLEAR ANTIBODIES: CPT

## 2023-12-15 PROCEDURE — 85025 COMPLETE CBC W/AUTO DIFF WBC: CPT

## 2023-12-15 PROCEDURE — 36415 COLL VENOUS BLD VENIPUNCTURE: CPT

## 2023-12-15 PROCEDURE — 85652 RBC SED RATE AUTOMATED: CPT | Performed by: FAMILY MEDICINE

## 2023-12-15 PROCEDURE — 86140 C-REACTIVE PROTEIN: CPT | Performed by: FAMILY MEDICINE

## 2023-12-18 DIAGNOSIS — D72.10 EOSINOPHILIA, UNSPECIFIED TYPE: Primary | ICD-10-CM

## 2023-12-18 LAB — ANA SER QL: NEGATIVE

## 2023-12-18 NOTE — PROGRESS NOTES
Phone call with patient.  Cause of her pruritus is unknown.  Workup so far unrevealing with the exception of an elevated absolute eosinophil count since his surgery.  I reviewed her CT scan reports.  No evidence of abnormal adenopathy.  Her inflammatory markers are normal.  Antinuclear antibody test normal.  She has had no foreign travel.  Parasitic infection less likely.  She has no rash.  Just the pruritus.  There is no evidence of cholestasis.  Both on lab work and imaging.  I am recommending referral to a hematologist for evaluation of the eosinophilia and further opinion.  Patient aware.  She states the itching continues to be an issue for her.  Medication so far has been unhelpful.

## 2024-01-23 ENCOUNTER — TELEPHONE (OUTPATIENT)
Dept: ONCOLOGY | Facility: CLINIC | Age: 44
End: 2024-01-23
Payer: COMMERCIAL

## 2024-01-24 ENCOUNTER — TELEPHONE (OUTPATIENT)
Dept: ONCOLOGY | Facility: CLINIC | Age: 44
End: 2024-01-24

## 2024-01-24 NOTE — TELEPHONE ENCOUNTER
Caller: Mayte Purvis    Relationship: Self        What was the call regarding: CANCELLED NEW PT LAB AND HEMATOLOGY APPT 02/01 DOES NOT FEEL IS NEEDED AT THIS TIME WILL CALL BACK IF STILL NEEDS THE APPT TO RESCHEDULE

## 2024-02-02 ENCOUNTER — TELEPHONE (OUTPATIENT)
Dept: ONCOLOGY | Facility: CLINIC | Age: 44
End: 2024-02-02

## 2024-02-02 NOTE — TELEPHONE ENCOUNTER
Caller: Mayte Purvis    Relationship to patient: Self    Best call back number: [phone]    Type of visit: NEW PT LAB AND CONSULT    Requested date: ASAP    If rescheduling, when is the original appointment: 2/1/24

## 2024-02-09 ENCOUNTER — CONSULT (OUTPATIENT)
Dept: ONCOLOGY | Facility: CLINIC | Age: 44
End: 2024-02-09
Payer: COMMERCIAL

## 2024-02-09 ENCOUNTER — LAB (OUTPATIENT)
Dept: OTHER | Facility: HOSPITAL | Age: 44
End: 2024-02-09
Payer: COMMERCIAL

## 2024-02-09 VITALS
OXYGEN SATURATION: 99 % | HEIGHT: 65 IN | TEMPERATURE: 97.3 F | RESPIRATION RATE: 16 BRPM | BODY MASS INDEX: 27.49 KG/M2 | SYSTOLIC BLOOD PRESSURE: 122 MMHG | DIASTOLIC BLOOD PRESSURE: 91 MMHG | WEIGHT: 165 LBS | HEART RATE: 76 BPM

## 2024-02-09 DIAGNOSIS — D72.10 EOSINOPHILIA, UNSPECIFIED TYPE: Primary | ICD-10-CM

## 2024-02-09 LAB
BASOPHILS # BLD AUTO: 0.08 10*3/MM3 (ref 0–0.2)
BASOPHILS NFR BLD AUTO: 0.6 % (ref 0–1.5)
DEPRECATED RDW RBC AUTO: 38.5 FL (ref 37–54)
EOSINOPHIL # BLD AUTO: 0.6 10*3/MM3 (ref 0–0.4)
EOSINOPHIL NFR BLD AUTO: 4.8 % (ref 0.3–6.2)
ERYTHROCYTE [DISTWIDTH] IN BLOOD BY AUTOMATED COUNT: 12.7 % (ref 12.3–15.4)
HCT VFR BLD AUTO: 40.2 % (ref 34–46.6)
HGB BLD-MCNC: 13.1 G/DL (ref 12–15.9)
IMM GRANULOCYTES # BLD AUTO: 0.07 10*3/MM3 (ref 0–0.05)
IMM GRANULOCYTES NFR BLD AUTO: 0.6 % (ref 0–0.5)
LYMPHOCYTES # BLD AUTO: 1.64 10*3/MM3 (ref 0.7–3.1)
LYMPHOCYTES NFR BLD AUTO: 13.2 % (ref 19.6–45.3)
MCH RBC QN AUTO: 27.4 PG (ref 26.6–33)
MCHC RBC AUTO-ENTMCNC: 32.6 G/DL (ref 31.5–35.7)
MCV RBC AUTO: 84.1 FL (ref 79–97)
MONOCYTES # BLD AUTO: 0.73 10*3/MM3 (ref 0.1–0.9)
MONOCYTES NFR BLD AUTO: 5.9 % (ref 5–12)
NEUTROPHILS NFR BLD AUTO: 74.9 % (ref 42.7–76)
NEUTROPHILS NFR BLD AUTO: 9.32 10*3/MM3 (ref 1.7–7)
NRBC BLD AUTO-RTO: 0 /100 WBC (ref 0–0.2)
PLATELET # BLD AUTO: 432 10*3/MM3 (ref 140–450)
PMV BLD AUTO: 8.8 FL (ref 6–12)
RBC # BLD AUTO: 4.78 10*6/MM3 (ref 3.77–5.28)
WBC NRBC COR # BLD AUTO: 12.44 10*3/MM3 (ref 3.4–10.8)

## 2024-02-09 PROCEDURE — 85025 COMPLETE CBC W/AUTO DIFF WBC: CPT | Performed by: INTERNAL MEDICINE

## 2024-02-09 PROCEDURE — 99204 OFFICE O/P NEW MOD 45 MIN: CPT | Performed by: INTERNAL MEDICINE

## 2024-02-09 PROCEDURE — 36415 COLL VENOUS BLD VENIPUNCTURE: CPT

## 2024-02-09 RX ORDER — TACROLIMUS 1 MG/G
OINTMENT TOPICAL
COMMUNITY
Start: 2024-02-02

## 2024-05-16 RX ORDER — AMLODIPINE BESYLATE 5 MG/1
5 TABLET ORAL DAILY
Qty: 90 TABLET | Refills: 1 | Status: SHIPPED | OUTPATIENT
Start: 2024-05-16

## 2024-05-16 NOTE — TELEPHONE ENCOUNTER
Rx Refill Note  Requested Prescriptions     Pending Prescriptions Disp Refills    amLODIPine (NORVASC) 5 MG tablet [Pharmacy Med Name: AMLODIPINE BESYLATE 5 MG TAB] 90 tablet 1     Sig: TAKE 1 TABLET BY MOUTH DAILY      Last office visit with prescribing clinician: 12/5/2023   Last telemedicine visit with prescribing clinician: Visit date not found   Next office visit with prescribing clinician: 10/21/2024                         Would you like a call back once the refill request has been completed: [] Yes [] No    If the office needs to give you a call back, can they leave a voicemail: [] Yes [] No    Luisa Hays  05/16/24, 11:18 EDT

## 2024-10-23 ENCOUNTER — OFFICE VISIT (OUTPATIENT)
Dept: FAMILY MEDICINE CLINIC | Facility: CLINIC | Age: 44
End: 2024-10-23
Payer: COMMERCIAL

## 2024-10-23 VITALS
DIASTOLIC BLOOD PRESSURE: 80 MMHG | TEMPERATURE: 97.3 F | HEART RATE: 76 BPM | HEIGHT: 65 IN | SYSTOLIC BLOOD PRESSURE: 106 MMHG | OXYGEN SATURATION: 99 % | BODY MASS INDEX: 28.51 KG/M2 | WEIGHT: 171.1 LBS

## 2024-10-23 DIAGNOSIS — I10 ESSENTIAL HYPERTENSION: ICD-10-CM

## 2024-10-23 DIAGNOSIS — Z12.11 SCREEN FOR COLON CANCER: ICD-10-CM

## 2024-10-23 DIAGNOSIS — Z00.00 HEALTH CARE MAINTENANCE: Primary | ICD-10-CM

## 2024-10-23 PROCEDURE — 99213 OFFICE O/P EST LOW 20 MIN: CPT | Performed by: FAMILY MEDICINE

## 2024-10-23 PROCEDURE — 99396 PREV VISIT EST AGE 40-64: CPT | Performed by: FAMILY MEDICINE

## 2024-10-23 NOTE — PROGRESS NOTES
"Chief Complaint  Annual Exam    Subjective        Mayte Purvis presents to [facility]  History of Present Illness    Annual healthcare maintenance visit.  Non-smoker.  No alcohol use.    Hypertension.  Hyperaldosteronism.  Also followed by endocrinology.  She continues on spironolactone 25 mg a day and also amlodipine 5 mg daily.  Her blood pressure today is on the low side.  She has not been checking her blood pressure at home.  She has no concerning cardiovascular symptomatology.  Her itching is resolved.  She went to a dermatologist.  They did some biopsies.  Reportedly she had lichen planus.  She was seen by hematology.  Also a allergy specialist.  No cause of the pruritus found.  Otherwise.    Objective   Vital Signs:  /80   Pulse 76   Temp 97.3 °F (36.3 °C) (Temporal)   Ht 165 cm (64.96\")   Wt 77.6 kg (171 lb 1.6 oz)   SpO2 99%   BMI 28.51 kg/m²   Estimated body mass index is 28.51 kg/m² as calculated from the following:    Height as of this encounter: 165 cm (64.96\").    Weight as of this encounter: 77.6 kg (171 lb 1.6 oz).          Physical Exam  Constitutional:       Appearance: Normal appearance.   HENT:      Head: Atraumatic.      Mouth/Throat:      Pharynx: Oropharynx is clear. No oropharyngeal exudate or posterior oropharyngeal erythema.   Eyes:      Conjunctiva/sclera: Conjunctivae normal.   Neck:      Comments: Thyroid examination unremarkable  Cardiovascular:      Rate and Rhythm: Normal rate and regular rhythm.      Pulses: Normal pulses.      Heart sounds: Normal heart sounds.   Pulmonary:      Effort: Pulmonary effort is normal.      Breath sounds: Normal breath sounds.   Abdominal:      General: Abdomen is flat. There is no distension.      Palpations: Abdomen is soft. There is no mass.      Tenderness: There is no abdominal tenderness.      Hernia: No hernia is present.   Musculoskeletal:         General: Normal range of motion.      Cervical back: " Normal range of motion and neck supple. No muscular tenderness.   Lymphadenopathy:      Cervical: No cervical adenopathy.   Skin:     General: Skin is warm and dry.   Neurological:      General: No focal deficit present.      Mental Status: She is alert and oriented to person, place, and time.   Psychiatric:         Mood and Affect: Mood normal.        Result Review :  The following data was reviewed by: Los Askew MD on 10/23/2024:  Common labs          12/5/2023    09:26 12/15/2023    13:27 2/9/2024    13:31   Common Labs   Glucose 84      BUN 13      Creatinine 0.71      Sodium 140      Potassium 4.2      Chloride 105      Calcium 9.0      Total Protein 7.0      Albumin 4.3      Total Bilirubin 0.5      Alkaline Phosphatase 76      AST (SGOT) 13      ALT (SGPT) 11      WBC 11.31  11.81  12.44    Hemoglobin 12.4  13.0  13.1    Hematocrit 39.1  40.4  40.2    Platelets 407  393  432      TSH          12/5/2023    09:26   TSH   TSH 3.150                Assessment and Plan   Diagnoses and all orders for this visit:    1. Health care maintenance (Primary)  -     Ambulatory Referral For Screening Colonoscopy    2. Screen for colon cancer  -     Ambulatory Referral For Screening Colonoscopy    3. Essential hypertension      Annual healthcare maintenance visit.    Immunizations discussed.    Colon cancer screening.  Colonoscopy recommended.  Referral placed.  Patient aware.    Hypertension.  Possibly overcontrolled.  Continue the spironolactone given the history of probable hyper Nathanael.  Continue to follow with endocrinology also.  She is going to be checking her blood pressure at home over the next few weeks.  I gave her written and verbal instructions.  If blood pressure running low at home discontinue amlodipine.  Reportedly a dermatologist said there have been case reports of amlodipine causing lichen planus?  Although her symptoms are now resolved.  And she continues amlodipine.    She continues to follow with  her gynecologist for cervical cancer screening and breast cancer screening.    See me in 1 year.  Sooner as needed.       Follow Up   No follow-ups on file.  Patient was given instructions and counseling regarding her condition or for health maintenance advice. Please see specific information pulled into the AVS if appropriate.

## 2024-11-05 RX ORDER — AMLODIPINE BESYLATE 5 MG/1
5 TABLET ORAL DAILY
Qty: 90 TABLET | Refills: 1 | Status: SHIPPED | OUTPATIENT
Start: 2024-11-05

## 2024-11-05 NOTE — TELEPHONE ENCOUNTER
Rx Refill Note  Requested Prescriptions     Pending Prescriptions Disp Refills    amLODIPine (NORVASC) 5 MG tablet [Pharmacy Med Name: amLODIPine BESYLATE 5 MG TAB] 90 tablet 1     Sig: TAKE 1 TABLET BY MOUTH DAILY      Last office visit with prescribing clinician: 10/23/2024   Last telemedicine visit with prescribing clinician: Visit date not found   Next office visit with prescribing clinician: 10/29/2025                         Would you like a call back once the refill request has been completed: [] Yes [] No    If the office needs to give you a call back, can they leave a voicemail: [] Yes [] No    Luisa Hays  11/05/24, 07:48 EST

## 2025-01-31 ENCOUNTER — PREP FOR SURGERY (OUTPATIENT)
Dept: OTHER | Facility: HOSPITAL | Age: 45
End: 2025-01-31
Payer: COMMERCIAL

## 2025-01-31 DIAGNOSIS — Z12.11 SCREENING FOR COLON CANCER: Primary | ICD-10-CM

## 2025-02-19 ENCOUNTER — ANESTHESIA EVENT (OUTPATIENT)
Dept: GASTROENTEROLOGY | Facility: HOSPITAL | Age: 45
End: 2025-02-19
Payer: COMMERCIAL

## 2025-02-19 ENCOUNTER — HOSPITAL ENCOUNTER (OUTPATIENT)
Facility: HOSPITAL | Age: 45
Setting detail: HOSPITAL OUTPATIENT SURGERY
Discharge: HOME | End: 2025-02-19
Attending: STUDENT IN AN ORGANIZED HEALTH CARE EDUCATION/TRAINING PROGRAM | Admitting: STUDENT IN AN ORGANIZED HEALTH CARE EDUCATION/TRAINING PROGRAM
Payer: COMMERCIAL

## 2025-02-19 ENCOUNTER — ANESTHESIA (OUTPATIENT)
Dept: GASTROENTEROLOGY | Facility: HOSPITAL | Age: 45
End: 2025-02-19
Payer: COMMERCIAL

## 2025-02-19 VITALS
SYSTOLIC BLOOD PRESSURE: 135 MMHG | RESPIRATION RATE: 16 BRPM | HEIGHT: 65 IN | WEIGHT: 168 LBS | DIASTOLIC BLOOD PRESSURE: 90 MMHG | HEART RATE: 71 BPM | OXYGEN SATURATION: 99 % | BODY MASS INDEX: 27.99 KG/M2

## 2025-02-19 PROCEDURE — 25010000002 LIDOCAINE 2% SOLUTION: Performed by: NURSE ANESTHETIST, CERTIFIED REGISTERED

## 2025-02-19 PROCEDURE — 25810000003 LACTATED RINGERS PER 1000 ML: Performed by: STUDENT IN AN ORGANIZED HEALTH CARE EDUCATION/TRAINING PROGRAM

## 2025-02-19 PROCEDURE — 45378 DIAGNOSTIC COLONOSCOPY: CPT | Performed by: STUDENT IN AN ORGANIZED HEALTH CARE EDUCATION/TRAINING PROGRAM

## 2025-02-19 PROCEDURE — 25010000002 PROPOFOL 10 MG/ML EMULSION: Performed by: NURSE ANESTHETIST, CERTIFIED REGISTERED

## 2025-02-19 RX ORDER — LIDOCAINE HYDROCHLORIDE 20 MG/ML
INJECTION, SOLUTION INFILTRATION; PERINEURAL AS NEEDED
Status: DISCONTINUED | OUTPATIENT
Start: 2025-02-19 | End: 2025-02-19 | Stop reason: SURG

## 2025-02-19 RX ORDER — PROPOFOL 10 MG/ML
VIAL (ML) INTRAVENOUS AS NEEDED
Status: DISCONTINUED | OUTPATIENT
Start: 2025-02-19 | End: 2025-02-19 | Stop reason: SURG

## 2025-02-19 RX ORDER — SODIUM CHLORIDE, SODIUM LACTATE, POTASSIUM CHLORIDE, CALCIUM CHLORIDE 600; 310; 30; 20 MG/100ML; MG/100ML; MG/100ML; MG/100ML
30 INJECTION, SOLUTION INTRAVENOUS CONTINUOUS PRN
Status: DISCONTINUED | OUTPATIENT
Start: 2025-02-19 | End: 2025-02-19 | Stop reason: HOSPADM

## 2025-02-19 RX ADMIN — SODIUM CHLORIDE, POTASSIUM CHLORIDE, SODIUM LACTATE AND CALCIUM CHLORIDE 30 ML/HR: 600; 310; 30; 20 INJECTION, SOLUTION INTRAVENOUS at 08:16

## 2025-02-19 RX ADMIN — PROPOFOL 100 MG: 10 INJECTION, EMULSION INTRAVENOUS at 08:49

## 2025-02-19 RX ADMIN — PROPOFOL 180 MCG/KG/MIN: 10 INJECTION, EMULSION INTRAVENOUS at 08:50

## 2025-02-19 RX ADMIN — LIDOCAINE HYDROCHLORIDE 60 MG: 20 INJECTION, SOLUTION INFILTRATION; PERINEURAL at 08:49

## 2025-02-19 NOTE — ANESTHESIA PREPROCEDURE EVALUATION
" Anesthesia Evaluation     Patient summary reviewed and Nursing notes reviewed                Airway   Mallampati: II  Dental      Pulmonary - negative pulmonary ROS   Cardiovascular     ECG reviewed  Rhythm: regular  Rate: normal    (+) hypertension      Neuro/Psych- negative ROS  GI/Hepatic/Renal/Endo    (+) renal disease- stones    Musculoskeletal (-) negative ROS    Abdominal    Substance History - negative use     OB/GYN negative ob/gyn ROS         Other                      Anesthesia Plan    ASA 2     MAC     (Heart cath 2019 without significant CAD after a \"positive\" nuclear stress test)  intravenous induction     Anesthetic plan, risks, benefits, and alternatives have been provided, discussed and informed consent has been obtained with: patient.    CODE STATUS:         "

## 2025-02-19 NOTE — DISCHARGE INSTRUCTIONS
For the next 24 hours patient needs to be with a responsible adult.    For 24 hours DO NOT drive, operate machinery, appliances, drink alcohol, make important decisions or sign legal documents.    Start with a light or bland diet if you are feeling sick to your stomach otherwise advance to regular diet as tolerated.    Follow recommendations on procedure report if provided by your doctor.    Call Dr stroud for problems [phone]    Problems may include but not limited to: large amounts of bleeding, trouble breathing, repeated vomiting, severe unrelieved pain, fever or chills.

## 2025-02-19 NOTE — OP NOTE
PREOPERATIVE DIAGNOSIS:  Screening    POSTOPERATIVE DIAGNOSIS AND FINDINGS:  Normal mucosa  Right sided diverticulosis    PROCEDURE:  Colonoscopy to cecum and terminal ileum    SURGEON:  Jazzy Polk MD    ANESTHESIA:  MAC    SPECIMEN(S):  none    DESCRIPTION:  In the decubitus position, a digital rectal exam was performed and was normal. The colonoscope was inserted under direct visualization of the lumen to the cecum, which was confirmed by visualization of the ileocecal valve and appendiceal orifice. The scope was then slowly withdrawn circumferentially examining all mucosal surfaces. There were no mucosal abnormalities. Scattered right sided diverticulosis seen.     The quality of the bowel preparation was good.    The patient tolerated the procedure well.    RECOMMENDATION FOR FUTURE SURVEILLANCE:  10 years    JAZZY POLK M.D.  General and Endoscopic Surgery  [facility]    [address], [address]  [place], [place], [address]  P: [phone]  F: [phone]

## 2025-02-19 NOTE — H&P
GENERAL SURGERY HISTORY AND PHYSICAL     Summary:    Mrs. Mayte Purvis is a 45 y.o. lady here for screening colonoscopy.     Chief Complaint:    Screening colonoscopy     History of Present Illness:    Mrs. Mayte Purvis is a 45 y.o. lady here for screening colonoscopy.    Denies bleeding, rectal pain, weight loss.     Denies family history of colon cancer.     Allergies:   Allergies   Allergen Reactions    Shrimp Hives    Iodine Hives, Itching and Rash     Medications:    Reviewed in Epic     Physical Exam:   Constitutional: Well-developed well-nourished, no acute distress  Eyes: Conjunctiva normal, sclera nonicteric  ENMT: Hearing grossly normal, oral mucosa moist  Neck: Supple, trachea midline  Respiratory: No increased work of breathing, normal inspiratory effort  Cardiovascular: Regular rate, no peripheral edema, no jugular venous distention  Gastrointestinal: Soft, nontender  Skin:  Warm, dry, no rash on visualized skin surfaces  Musculoskeletal: Symmetric strength, normal gait  Psychiatric: Alert and oriented ×3, normal affect     Jazzy Pierre M.D.  General and Endoscopic Surgery  [facility]    [address], [address]  [place], [place], [address]  P: [phone]  F: [phone]

## 2025-02-19 NOTE — ANESTHESIA POSTPROCEDURE EVALUATION
Patient: Mayte Purvis    Procedure Summary       Date: 02/19/25 Room / Location: [facility] ENDOSCOPY 4 /  [facility]    Anesthesia Start: 0845 Anesthesia Stop: 0903    Procedure: COLONOSCOPY into cecum and terminal ileum Diagnosis:       Screening for colon cancer      (Screening for colon cancer [Z12.11])    Surgeons: Jazzy Pierre MD Provider: Jacky Sue MD    Anesthesia Type: MAC ASA Status: 2            Anesthesia Type: MAC    Vitals  Vitals Value Taken Time   /90 02/19/25 0926   Temp     Pulse 68 02/19/25 0931   Resp 16 02/19/25 0925   SpO2 100 % 02/19/25 0929   Vitals shown include unfiled device data.        Post Anesthesia Care and Evaluation    Patient location during evaluation: PACU  Patient participation: complete - patient participated  Level of consciousness: awake and alert  Pain management: adequate    Airway patency: patent  Anesthetic complications: No anesthetic complications    Cardiovascular status: acceptable  Respiratory status: acceptable  Hydration status: acceptable    Comments: --------------------            02/19/25 0925     --------------------   BP:       135/90     Pulse:      71       Resp:       16       SpO2:      99%      --------------------

## 2025-06-17 NOTE — PROGRESS NOTES
.     REASON FOR CONSULTATION:   Eosinophilia  Provide an opinion on any further workup or treatment                             REQUESTING PHYSICIAN: Los Askew MD  RECORDS OBTAINED:  Records of the patient's history including those obtained from the referring provider were reviewed and summarized in detail.    HISTORY OF PRESENT ILLNESS:  The patient is a 44 y.o. year old female  who is here for follow-up with the above-mentioned history.    Reviewed PCP last note from 2023 pruritus with cause unknown.  Workup unrevealing except for elevated absolute eosinophil count since surgery.    She states her main complaint is diffuse itching and rash.  She states she saw dermatologist and had a biopsy and was diagnosed with lichen planus.  She states the itching persists.    Past Medical History:   Diagnosis Date    Hypertension 2016    Kidney stone 2018     Past Surgical History:   Procedure Laterality Date     SECTION      x 3 (, ,     HYSTERECTOMY  2023    tracey       MEDICATIONS    Current Outpatient Medications:     amLODIPine (NORVASC) 5 MG tablet, Take 1 tablet by mouth Daily., Disp: 90 tablet, Rfl: 1    ergocalciferol (ERGOCALCIFEROL) 1.25 MG (02554 UT) capsule, Take 1 capsule by mouth., Disp: , Rfl:     ferrous sulfate 324 (65 Fe) MG tablet delayed-release EC tablet, Take 1 tablet by mouth Daily With Breakfast., Disp: , Rfl:     hydrOXYzine (ATARAX) 25 MG tablet, Take 1 tablet by mouth 3 (Three) Times a Day As Needed for Itching., Disp: 30 tablet, Rfl: 0    spironolactone (Aldactone) 25 MG tablet, Take 1 tablet by mouth Daily., Disp: 90 tablet, Rfl: 1    tacrolimus (PROTOPIC) 0.1 % ointment, , Disp: , Rfl:     predniSONE (DELTASONE) 20 MG tablet, Take 1 tablet by mouth Daily., Disp: 5 tablet, Rfl: 0    ALLERGIES:     Allergies   Allergen Reactions    Shrimp Hives    Iodine Hives, Itching and Rash       SOCIAL HISTORY:       Social History     Socioeconomic  "History    Marital status:      Spouse name: Cyrus   Tobacco Use    Smoking status: Never    Smokeless tobacco: Never   Vaping Use    Vaping Use: Never used   Substance and Sexual Activity    Drug use: Not Currently    Sexual activity: Defer         FAMILY HISTORY:  No family history on file.    REVIEW OF SYSTEMS:  Review of Systems   Constitutional:  Negative for activity change.   HENT:  Negative for nosebleeds and trouble swallowing.    Respiratory:  Negative for shortness of breath and wheezing.    Cardiovascular:  Negative for chest pain and palpitations.   Gastrointestinal:  Negative for constipation, diarrhea and nausea.   Genitourinary:  Negative for dysuria and hematuria.   Musculoskeletal:  Negative for arthralgias and myalgias.   Skin:  Negative for rash and wound.   Neurological:  Negative for seizures and syncope.   Hematological:  Negative for adenopathy. Does not bruise/bleed easily.   Psychiatric/Behavioral:  Negative for confusion.               Vitals:    02/09/24 1341   BP: 122/91   Pulse: 76   Resp: 16   Temp: 97.3 °F (36.3 °C)   TempSrc: Temporal   SpO2: 99%   Weight: 74.8 kg (165 lb)   Height: 165 cm (64.96\")   PainSc: 0-No pain         2/9/2024     1:44 PM   Current Status   ECOG score 0      PHYSICAL EXAM:        CONSTITUTIONAL:  Vital signs reviewed.  No distress, looks comfortable.  EYES:  Conjunctiva and lids unremarkable.  PERRLA  EARS,NOSE,MOUTH,THROAT:  Ears and nose appear unremarkable.  Lips, teeth, gums appear unremarkable.  RESPIRATORY:  Normal respiratory effort.  Lungs clear to auscultation bilaterally.  CARDIOVASCULAR:  Normal S1, S2.  No murmurs rubs or gallops.  No significant lower extremity edema.  GASTROINTESTINAL: Abdomen appears unremarkable.  Nontender.  No hepatomegaly.  No splenomegaly.  LYMPHATIC:  No cervical, supraclavicular, axillary lymphadenopathy.  SKIN:  Warm.  No rashes.  PSYCHIATRIC:  Normal judgment and insight.  Normal mood and affect.         RECENT " LABS:        WBC   Date Value Ref Range Status   02/09/2024 12.44 (H) 3.40 - 10.80 10*3/mm3 Final   12/15/2023 11.81 (H) 3.40 - 10.80 10*3/mm3 Final   12/05/2023 11.31 (H) 3.40 - 10.80 10*3/mm3 Final   10/16/2023 9.08 3.40 - 10.80 10*3/mm3 Final   09/28/2023 14.53 (H) 4.5 - 11.0 10*3/uL Final   09/23/2023 13.60 (H) 4.5 - 11.0 10*3/uL Final   08/25/2023 8.69 4.5 - 11.0 10*3/uL Final   07/17/2023 8.57 4.5 - 11.0 10*3/uL Final   04/08/2022 8.65 4.5 - 11.0 10*3/uL Final   11/11/2019 12.45 (H) 4.5 - 11.0 10*3/uL Final   11/09/2019 11.60 (H) 4.5 - 11.0 10*3/uL Final     Hemoglobin   Date Value Ref Range Status   02/09/2024 13.1 12.0 - 15.9 g/dL Final   12/15/2023 13.0 12.0 - 15.9 g/dL Final   12/05/2023 12.4 12.0 - 15.9 g/dL Final   10/16/2023 13.0 12.0 - 15.9 g/dL Final   09/28/2023 12.3 12.0 - 16.0 g/dL Final   09/23/2023 11.2 (L) 12.0 - 16.0 g/dL Final   08/25/2023 12.2 12.0 - 16.0 g/dL Final   07/17/2023 13.3 12.0 - 16.0 g/dL Final   04/08/2022 13.3 12.0 - 16.0 g/dL Final   11/11/2019 11.3 (L) 12.0 - 16.0 g/dL Final   11/10/2019 10.7 (L) 12.0 - 16.0 g/dL Final   11/09/2019 11.3 (L) 12.0 - 16.0 g/dL Final     Platelets   Date Value Ref Range Status   02/09/2024 432 140 - 450 10*3/mm3 Final   12/15/2023 393 140 - 450 10*3/mm3 Final   12/05/2023 407 140 - 450 10*3/mm3 Final   10/16/2023 460 (H) 140 - 450 10*3/mm3 Final   09/28/2023 338 140 - 440 10*3/uL Final   09/23/2023 309 140 - 440 10*3/uL Final   08/25/2023 351 140 - 440 10*3/uL Final   07/17/2023 404 140 - 440 10*3/uL Final   04/08/2022 424 140 - 440 10*3/uL Final   11/11/2019 358 140 - 440 10*3/uL Final   11/09/2019 371 140 - 440 10*3/uL Final       Assessment & Plan   There are no diagnoses linked to this encounter.      Mayte Purvis   *Eosinophilia  11/9/2019 - 9/28/2023: [facility]   10/16/2023 - 12/15/2023: -770  2/9/2024 (initial consult): .  I offered a bone marrow biopsy for marrow disorders that can cause hypereosinophilia.  However, if  generally eosinophils are only felt to be a concern if the ANC is above 1500 on at least 2 exams  by at least 1 month.  Therefore, she declines further workup which I think is reasonable.    *Leukocytosis  11/9/2019 - 12/15/2023: WBC 8.6-14.5  2/9/2024 (initial consult): WBC 12.4 predominance of mature segmented neutrophils.  Therefore, appears reactive.    *Iron deficiency  4/8/22: Ferritin 17, 15% saturation  Hb normal.  She declines any further assessment of this.  States she is supposed to be on oral iron but has not been taking it recently.    *Microcytosis  MCV around 81    *Pruritus due to lichen planus  States her dermatologist diagnosed her with lichen planus.  She asked me about drug-induced lichen planus  On amlodipine for 5 to 8 years.  Started Aldactone summer 2023.  Apparently there have been case reports of both drugs causing lichen planus.  Apparently this can begin several weeks to years after beginning the drugs.  She will discuss this with her PCP.  I told her just because these reports are there does not mean these drugs are the cause of her lichen planus.  However, I think it is possible this is the cause of her lichen planus      Plan  She declines any further follow-up here or testing here.  I think that is reasonable                  Calm

## (undated) DEVICE — TUBING, SUCTION, 1/4" X 10', STRAIGHT: Brand: MEDLINE

## (undated) DEVICE — ADAPT CLN BIOGUARD AIR/H2O DISP

## (undated) DEVICE — LN SMPL CO2 SHTRM SD STREAM W/M LUER

## (undated) DEVICE — KT ORCA ORCAPOD DISP STRL

## (undated) DEVICE — CANN O2 ETCO2 FITS ALL CONN CO2 SMPL A/ 7IN DISP LF

## (undated) DEVICE — SENSR O2 OXIMAX FNGR A/ 18IN NONSTR